# Patient Record
Sex: FEMALE | Race: WHITE | URBAN - NONMETROPOLITAN AREA
[De-identification: names, ages, dates, MRNs, and addresses within clinical notes are randomized per-mention and may not be internally consistent; named-entity substitution may affect disease eponyms.]

---

## 2024-03-20 ENCOUNTER — OFFICE VISIT (OUTPATIENT)
Dept: NEUROLOGY | Age: 49
End: 2024-03-20
Payer: COMMERCIAL

## 2024-03-20 VITALS
HEIGHT: 64 IN | SYSTOLIC BLOOD PRESSURE: 133 MMHG | HEART RATE: 72 BPM | DIASTOLIC BLOOD PRESSURE: 85 MMHG | WEIGHT: 175 LBS | OXYGEN SATURATION: 98 % | BODY MASS INDEX: 29.88 KG/M2

## 2024-03-20 DIAGNOSIS — R20.2 PARESTHESIAS: ICD-10-CM

## 2024-03-20 DIAGNOSIS — R20.2 PARESTHESIAS: Primary | ICD-10-CM

## 2024-03-20 LAB
ALBUMIN SERPL-MCNC: 4.3 G/DL (ref 3.5–5.2)
ALP SERPL-CCNC: 84 U/L (ref 35–104)
ALT SERPL-CCNC: 12 U/L (ref 5–33)
ANION GAP SERPL CALCULATED.3IONS-SCNC: 12 MMOL/L (ref 7–19)
AST SERPL-CCNC: 13 U/L (ref 5–32)
BASOPHILS # BLD: 0.1 K/UL (ref 0–0.2)
BASOPHILS NFR BLD: 0.9 % (ref 0–1)
BILIRUB SERPL-MCNC: 0.3 MG/DL (ref 0.2–1.2)
BUN SERPL-MCNC: 9 MG/DL (ref 6–20)
CALCIUM SERPL-MCNC: 9.6 MG/DL (ref 8.6–10)
CHLORIDE SERPL-SCNC: 106 MMOL/L (ref 98–111)
CO2 SERPL-SCNC: 26 MMOL/L (ref 22–29)
CREAT SERPL-MCNC: 0.8 MG/DL (ref 0.5–0.9)
CRP SERPL HS-MCNC: 0.48 MG/DL (ref 0–0.5)
EOSINOPHIL # BLD: 0.3 K/UL (ref 0–0.6)
EOSINOPHIL NFR BLD: 4.9 % (ref 0–5)
ERYTHROCYTE [DISTWIDTH] IN BLOOD BY AUTOMATED COUNT: 12.5 % (ref 11.5–14.5)
ERYTHROCYTE [SEDIMENTATION RATE] IN BLOOD BY WESTERGREN METHOD: 15 MM/HR (ref 0–25)
FOLATE SERPL-MCNC: 3.5 NG/ML (ref 4.8–37.3)
GLUCOSE SERPL-MCNC: 79 MG/DL (ref 74–109)
HCT VFR BLD AUTO: 43.2 % (ref 37–47)
HGB BLD-MCNC: 14.1 G/DL (ref 12–16)
IMM GRANULOCYTES # BLD: 0 K/UL
LYMPHOCYTES # BLD: 2.1 K/UL (ref 1.1–4.5)
LYMPHOCYTES NFR BLD: 37.4 % (ref 20–40)
MCH RBC QN AUTO: 29.8 PG (ref 27–31)
MCHC RBC AUTO-ENTMCNC: 32.6 G/DL (ref 33–37)
MCV RBC AUTO: 91.3 FL (ref 81–99)
MONOCYTES # BLD: 0.2 K/UL (ref 0–0.9)
MONOCYTES NFR BLD: 4.2 % (ref 0–10)
NEUTROPHILS # BLD: 2.9 K/UL (ref 1.5–7.5)
NEUTS SEG NFR BLD: 52.4 % (ref 50–65)
PLATELET # BLD AUTO: 247 K/UL (ref 130–400)
PMV BLD AUTO: 9.7 FL (ref 9.4–12.3)
POTASSIUM SERPL-SCNC: 4 MMOL/L (ref 3.5–5)
PROT SERPL-MCNC: 7.4 G/DL (ref 6.6–8.7)
RBC # BLD AUTO: 4.73 M/UL (ref 4.2–5.4)
RHEUMATOID FACT SER NEPH-ACNC: <10 IU/ML
SODIUM SERPL-SCNC: 144 MMOL/L (ref 136–145)
TSH SERPL DL<=0.005 MIU/L-ACNC: 1.52 UIU/ML (ref 0.35–5.5)
VIT B12 SERPL-MCNC: 249 PG/ML (ref 211–946)
WBC # BLD AUTO: 5.5 K/UL (ref 4.8–10.8)

## 2024-03-20 PROCEDURE — 99203 OFFICE O/P NEW LOW 30 MIN: CPT | Performed by: NURSE PRACTITIONER

## 2024-03-20 RX ORDER — SEMAGLUTIDE 2.4 MG/.75ML
2.4 INJECTION, SOLUTION SUBCUTANEOUS
COMMUNITY

## 2024-03-20 RX ORDER — OMEPRAZOLE 20 MG/1
20 CAPSULE, DELAYED RELEASE ORAL DAILY
COMMUNITY
Start: 2024-02-14

## 2024-03-20 RX ORDER — MONTELUKAST SODIUM 10 MG/1
10 TABLET ORAL
COMMUNITY

## 2024-03-20 RX ORDER — SIMVASTATIN 20 MG
20 TABLET ORAL EVERY EVENING
COMMUNITY

## 2024-03-20 RX ORDER — ONDANSETRON HYDROCHLORIDE 8 MG/1
8 TABLET, FILM COATED ORAL
COMMUNITY
Start: 2023-11-14

## 2024-03-20 RX ORDER — ESCITALOPRAM OXALATE 10 MG/1
10 TABLET ORAL
COMMUNITY

## 2024-03-20 NOTE — PROGRESS NOTES
REVIEW OF SYSTEMS    Constitutional: []Fever []Sweat []Chills [] Recent Injury [x] Denies all unless marked  HEENT:[x]Headache  [] Head Injury/Hearing Loss  [] Sore Throat  [] Ear Ache/Dizziness  [x] Denies all unless marked  Spine:  [x] Neck pain  [] Back pain  [] Sciaticia  [x] Denies all unless marked  Cardiovascular:[]Heart Disease []Chest Pain [] Palpitations  [x] Denies all unless marked  Pulmonary: []Shortness of Breath []Cough   [x] Denies all unless marke  Gastrointestinal: []Nausea  []Vomiting  []Abdominal Pain  []Constipation  []Diarrhea  []Dark Bloody Stools  [x] Denies all unless marked  Psychiatric/Behavioral:[] Depression [] Anxiety [x] Denies all unless marked  Genitourinary:   [] Frequency  [] Urgency  [] Incontinence [] Pain with Urination  [x] Denies all unless marked  Extremities: []Pain  []Swelling  [x] Denies all unless marked  Musculoskeletal: [] Muscle Pain  [] Joint Pain  [] Arthritis [] Muscle Cramps [] Muscle Twitches  [x] Denies all unless marked  Sleep: [] Insomnia [] Snoring [] Restless Legs [] Sleep Apnea  [] Daytime Sleepiness  [x] Denies all unless marked  Skin:[] Rash [] Skin Discoloration [x] Denies all unless marked   Neurological: []Visual Disturbance/Memory Loss [x] Loss of Balance [] Slurred Speech/Weakness [] Seizures  [] Vertigo/Dizziness [x] Denies all unless marked       
present  [x]No rigidity or bradykinesia noted  COMMENTS:   Sensory  []Sensation intact to light touch, pin prick, vibration, and proprioception BLE  []Sensation intact to light touch, pin prick, vibration, and proprioception BUE  COMMENTS: Decreased pinprick distal extremities   Coordination [x]FTN normal bilaterally   []HTS normal bilaterally  [x]ROSALES normal bilaterally.   COMMENTS:   Reflexes  []Symmetric and non-pathological  [x]Toes down going bilaterally  [x]No clonus present  COMMENTS: Hyperreflexic throughout   Gait                  [x]Normal steady gait    []Ataxic    []Spastic     []Magnetic     []Shuffling  COMMENTS:       LABS RECORD AND IMAGING REVIEW (As below and per HPI)      Reviewed referral records     ASSESSMENT:    Kacey Carlson is a 48 y.o. year old female here for evaluation of paresthesias, primarily to distal feet.  Ongoing for about 3 years with progressive worsening.  There is pain component described as throbbing, fairly constant but worse at night.  She has tried and failed topical treatments, stretching.  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  No clear weakness.  No clear claudication symptoms.  Exam with significantly decreased pinprick to distal extremities.  Consistent with neuropathy.  Will complete blood work for any underlying secondary factors and nerve conduction study for definitive diagnosis.  Consider treatment of pain with TCAs, Cymbalta, gabapentin or Lyrica at follow-up pending testing.      ICD-10-CM    1. Paresthesias  R20.2 Nerve Conduction Test with EMG     Electrophoresis Protein, Serum     Rheumatoid Factor     Sedimentation Rate     Vitamin B12 & Folate     CURTIS Screen with Reflex     Celiac AG IGA/IGG Screen w Reflex     Celiac Reflex Panel     Comprehensive Metabolic Panel     CBC with Auto Differential     C-Reactive Protein     Heavy Metal Blood Panel     TSH with Reflex to FT4            PLAN:  Labs as listed  NCS/EMG  Treatment pending

## 2024-03-21 ENCOUNTER — TELEPHONE (OUTPATIENT)
Dept: NEUROLOGY | Age: 49
End: 2024-03-21

## 2024-03-21 LAB
ARSENIC BLD-MCNC: <10 UG/L
CADMIUM BLD-MCNC: <1 UG/L
LEAD BLD-MCNC: <2 UG/DL
MERCURY BLD-MCNC: <2.5 UG/L

## 2024-03-21 NOTE — TELEPHONE ENCOUNTER
Called and spoke with patient. I advised her of BW note seen below she voiced her understanding and had no questions at this time.    ----- Message from CARMEN Araujo CNP sent at 3/20/2024 12:53 PM CDT -----  Please let her know that her vitamin B12 is on the low side of normal and her folate level was low, she needs to start taking an over-the-counter B12 supplement.

## 2024-03-22 LAB
NUCLEAR IGG SER QL IA: DETECTED
TTG IGA SER IA-ACNC: <1.02 FLU (ref 0–4.99)

## 2024-03-23 LAB
ALBUMIN SERPL-MCNC: 4.09 G/DL (ref 3.75–5.01)
ALPHA1 GLOB SERPL ELPH-MCNC: 0.26 G/DL (ref 0.19–0.46)
ALPHA2 GLOB SERPL ELPH-MCNC: 0.82 G/DL (ref 0.48–1.05)
ANTINUCLEAR AB INTERPRETIVE COMMENT: NORMAL
B-GLOBULIN SERPL ELPH-MCNC: 0.78 G/DL (ref 0.48–1.1)
GAMMA GLOB SERPL ELPH-MCNC: 1.16 G/DL (ref 0.62–1.51)
INTERPRETATION SERPL IFE-IMP: NORMAL
NUCLEAR IGG SER QL IF: NORMAL
PROT SERPL-MCNC: 7.1 G/DL (ref 6.3–8.2)
PROTEIN ELECTROPHORESIS, SERUM: NORMAL

## 2024-04-11 ENCOUNTER — HOSPITAL ENCOUNTER (OUTPATIENT)
Dept: NEUROLOGY | Age: 49
Discharge: HOME | End: 2024-04-11
Payer: COMMERCIAL

## 2024-04-11 DIAGNOSIS — R20.2 PARESTHESIAS: ICD-10-CM

## 2024-04-11 PROCEDURE — 95886 MUSC TEST DONE W/N TEST COMP: CPT | Performed by: PSYCHIATRY & NEUROLOGY

## 2024-04-11 PROCEDURE — 95886 MUSC TEST DONE W/N TEST COMP: CPT

## 2024-04-11 PROCEDURE — 95909 NRV CNDJ TST 5-6 STUDIES: CPT

## 2024-04-11 PROCEDURE — 95909 NRV CNDJ TST 5-6 STUDIES: CPT | Performed by: PSYCHIATRY & NEUROLOGY

## 2024-05-29 ENCOUNTER — OFFICE VISIT (OUTPATIENT)
Dept: NEUROLOGY | Age: 49
End: 2024-05-29
Payer: COMMERCIAL

## 2024-05-29 VITALS
SYSTOLIC BLOOD PRESSURE: 121 MMHG | DIASTOLIC BLOOD PRESSURE: 74 MMHG | WEIGHT: 175 LBS | OXYGEN SATURATION: 97 % | HEIGHT: 64 IN | HEART RATE: 62 BPM | BODY MASS INDEX: 29.88 KG/M2

## 2024-05-29 DIAGNOSIS — R52 BURNING PAIN: ICD-10-CM

## 2024-05-29 DIAGNOSIS — Z79.899 MEDICATION MANAGEMENT: ICD-10-CM

## 2024-05-29 DIAGNOSIS — E53.8 B12 DEFICIENCY: ICD-10-CM

## 2024-05-29 DIAGNOSIS — R20.2 PARESTHESIAS: ICD-10-CM

## 2024-05-29 DIAGNOSIS — R20.2 PARESTHESIAS: Primary | ICD-10-CM

## 2024-05-29 LAB
FOLATE SERPL-MCNC: >20 NG/ML (ref 4.8–37.3)
VIT B12 SERPL-MCNC: 859 PG/ML (ref 211–946)

## 2024-05-29 PROCEDURE — 99214 OFFICE O/P EST MOD 30 MIN: CPT | Performed by: NURSE PRACTITIONER

## 2024-05-29 RX ORDER — CHOLECALCIFEROL (VITAMIN D3) 25 MCG
TABLET,CHEWABLE ORAL
COMMUNITY
Start: 2024-04-01

## 2024-05-29 RX ORDER — NORTRIPTYLINE HYDROCHLORIDE 25 MG/1
25 CAPSULE ORAL NIGHTLY
Qty: 30 CAPSULE | Refills: 3 | Status: SHIPPED | OUTPATIENT
Start: 2024-05-29

## 2024-05-29 NOTE — PROGRESS NOTES
REVIEW OF SYSTEMS    Constitutional: []Fever []Sweat []Chills [] Recent Injury [x] Denies all unless marked  HEENT:[]Headache  [] Head Injury/Hearing Loss  [] Sore Throat  [] Ear Ache/Dizziness  [x] Denies all unless marked  Spine:  [x] Neck pain  [] Back pain  [] Sciaticia  [x] Denies all unless marked  Cardiovascular:[]Heart Disease []Chest Pain [] Palpitations  [x] Denies all unless marked  Pulmonary: []Shortness of Breath []Cough   [x] Denies all unless marke  Gastrointestinal: []Nausea  []Vomiting  []Abdominal Pain  []Constipation  []Diarrhea  []Dark Bloody Stools  [x] Denies all unless marked  Psychiatric/Behavioral:[] Depression [] Anxiety [x] Denies all unless marked  Genitourinary:   [] Frequency  [] Urgency  [] Incontinence [] Pain with Urination  [x] Denies all unless marked  Extremities: []Pain  []Swelling  [x] Denies all unless marked  Musculoskeletal: [] Muscle Pain  [] Joint Pain  [] Arthritis [] Muscle Cramps [] Muscle Twitches  [x] Denies all unless marked  Sleep: [] Insomnia [] Snoring [] Restless Legs [] Sleep Apnea  [] Daytime Sleepiness  [x] Denies all unless marked  Skin:[] Rash [] Skin Discoloration [x] Denies all unless marked   Neurological: []Visual Disturbance/Memory Loss [x] Loss of Balance [] Slurred Speech/Weakness [] Seizures  [] Vertigo/Dizziness [x] Denies all unless marked       
Will also start Pamelor 25 mg nightly to help with discomfort, can titrate this up.  Could consider gabapentin down the line.        ICD-10-CM    1. Paresthesias  R20.2 nortriptyline (PAMELOR) 25 MG capsule     Vitamin B12 & Folate     Vascular duplex lower extremity arteries bilateral with CHA      2. Burning pain  R52 nortriptyline (PAMELOR) 25 MG capsule     Vitamin B12 & Folate     Vascular duplex lower extremity arteries bilateral with CHA      3. B12 deficiency  E53.8       4. Medication management  Z79.899           PLAN:  Recheck vitamin B levels.  Continue vitamin B complex as discussed.  CHA's BLE  Start Pamelor 25 mg nightly, side effects discussed.  On Lexapro, discussed interactions.  With any worsening or new symptoms will complete MRI brain.  6.  Return in about 3 months (around 8/29/2024) for Follow up, sooner with worsening symptoms.    CARMEN Araujo    I spent 34 minutes caring for Kacey Carlson on this date of service. This time includes time spent by me in the following activities: preparing for the visit, reviewing tests, performing a medically appropriate examination and/or evaluation , counseling and educating the patient/family/caregiver, ordering medications, tests, or procedures, documenting information in the medical record and care coordination.    This dictation was generated by voice recognition computer software.  Although all attempts were made to edit the dictation for accuracy, there may be errors in the transcription that are not intended.

## 2024-06-17 DIAGNOSIS — R52 BURNING PAIN: Primary | ICD-10-CM

## 2024-06-17 RX ORDER — NORTRIPTYLINE HYDROCHLORIDE 10 MG/1
10 CAPSULE ORAL NIGHTLY
Qty: 30 CAPSULE | Refills: 3 | Status: SHIPPED | OUTPATIENT
Start: 2024-06-17

## 2024-06-21 ENCOUNTER — HOSPITAL ENCOUNTER (OUTPATIENT)
Dept: VASCULAR LAB | Age: 49
Discharge: HOME | End: 2024-06-21
Payer: COMMERCIAL

## 2024-06-21 DIAGNOSIS — R20.2 PARESTHESIAS: ICD-10-CM

## 2024-06-21 DIAGNOSIS — R52 BURNING PAIN: ICD-10-CM

## 2024-06-21 LAB
VAS IMMEDIATE LEFT ABI: 1.15
VAS IMMEDIATE LEFT POST TIBIAL BP: 117 MMHG
VAS IMMEDIATE RIGHT ABI: 1.23
VAS IMMEDIATE RIGHT BRACHIAL BP: 102 MMHG
VAS IMMEDIATE RIGHT POST TIBIAL BP: 125 MMHG
VAS LEFT ABI: 1.16
VAS LEFT ARM BP: 103 MMHG
VAS LEFT CALF PRESSURE: 142 MMHG
VAS LEFT DORSALIS PEDIS BP: 121 MMHG
VAS LEFT HIGH THIGH PRESSURE: 144 MMHG
VAS LEFT LOW THIGH PRESSURE: 144 MMHG
VAS LEFT PTA BP: 122 MMHG
VAS LEFT TBI: 0.68
VAS LEFT TOE PRESSURE: 71 MMHG
VAS RIGHT ABI: 1.22
VAS RIGHT ARM BP: 105 MMHG
VAS RIGHT CALF PRESSURE: 144 MMHG
VAS RIGHT DORSALIS PEDIS BP: 120 MMHG
VAS RIGHT HIGH THIGH PRESSURE: 149 MMHG
VAS RIGHT LOW THIGH PRESSURE: 143 MMHG
VAS RIGHT PTA BP: 128 MMHG
VAS RIGHT TBI: 0.49
VAS RIGHT TOE PRESSURE: 51 MMHG

## 2024-06-21 PROCEDURE — 93923 UPR/LXTR ART STDY 3+ LVLS: CPT

## 2024-06-25 PROCEDURE — 93923 UPR/LXTR ART STDY 3+ LVLS: CPT | Performed by: SURGERY

## 2024-08-12 ENCOUNTER — OFFICE VISIT (OUTPATIENT)
Dept: NEUROLOGY | Age: 49
End: 2024-08-12
Payer: COMMERCIAL

## 2024-08-12 VITALS
WEIGHT: 175 LBS | DIASTOLIC BLOOD PRESSURE: 92 MMHG | HEIGHT: 64 IN | HEART RATE: 79 BPM | BODY MASS INDEX: 29.88 KG/M2 | SYSTOLIC BLOOD PRESSURE: 130 MMHG | OXYGEN SATURATION: 98 %

## 2024-08-12 DIAGNOSIS — R20.2 PARESTHESIAS: Primary | ICD-10-CM

## 2024-08-12 DIAGNOSIS — R52 BURNING PAIN: ICD-10-CM

## 2024-08-12 DIAGNOSIS — Z79.899 MEDICATION MANAGEMENT: ICD-10-CM

## 2024-08-12 PROCEDURE — 99213 OFFICE O/P EST LOW 20 MIN: CPT | Performed by: NURSE PRACTITIONER

## 2024-08-12 RX ORDER — NORTRIPTYLINE HYDROCHLORIDE 25 MG/1
CAPSULE ORAL
COMMUNITY
Start: 2024-07-07

## 2024-08-12 NOTE — PROGRESS NOTES

## 2024-08-12 NOTE — PROGRESS NOTES
[facility] Office Note      Patient:   Kacey Carlson  MR#:    [id number]  Account Number:                         YOB: 1975  Date of Evaluation:  2024  Time of Note:                          3:06 PM  Primary/Referring Physician:  Sheree Olivares MD   Consulting Physician:  CARMEN Araujo    FOLLOW-UP      Chief Complaint   Patient presents with    Follow-up    Numbness     Pt states numbness is better.        HISTORY OF PRESENT ILLNESS    Kacey Carlson is a 48 y.o. year old female here for follow-up of BLE pain and paresthesias.  She is doing well, no worsening or new complaints.  At prior visit started Pamelor 25 mg nightly, has seen clear benefit from this medication.  Experiencing some dry mouth, overall tolerating well. At previous visit we discussed numbness and tingling to her bilateral distal feet, ongoing for about 3 years with progressive worsening.  She also notes some paresthesias to pinky, ring, middle finger at times when she is resting on her elbows.  There is some ongoing imbalance.  She states there is associated pain to feet, described as throbbing.  Worse at night.  Denies clear associated weakness.  Has used IcyHot, over-the-counter creams, stretching, with minimal benefit.  No clear worsening with exertion.  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 She is a .  NCS/EMG BLE normal.  Labs normal outside of low B12/folate level.  ABIs completed were unremarkable.      History reviewed. No pertinent past medical history.    Past Surgical History:   Procedure Laterality Date     SECTION      x3       History reviewed. No pertinent family history.    Social History     Socioeconomic History    Marital status:      Spouse name: Not on file    Number of children: Not on

## 2024-09-16 DIAGNOSIS — R52 PAIN, UNSPECIFIED: ICD-10-CM

## 2024-09-16 DIAGNOSIS — R20.2 PARESTHESIA OF SKIN: ICD-10-CM

## 2024-09-16 RX ORDER — NORTRIPTYLINE HCL 25 MG
25 CAPSULE ORAL NIGHTLY
Qty: 30 CAPSULE | Refills: 3 | Status: SHIPPED | OUTPATIENT
Start: 2024-09-16

## 2025-02-17 ENCOUNTER — OFFICE VISIT (OUTPATIENT)
Dept: NEUROLOGY | Age: 50
End: 2025-02-17
Payer: COMMERCIAL

## 2025-02-17 VITALS
HEIGHT: 64 IN | OXYGEN SATURATION: 96 % | SYSTOLIC BLOOD PRESSURE: 112 MMHG | WEIGHT: 175 LBS | HEART RATE: 78 BPM | BODY MASS INDEX: 29.88 KG/M2 | DIASTOLIC BLOOD PRESSURE: 81 MMHG

## 2025-02-17 DIAGNOSIS — R52 PAIN, UNSPECIFIED: ICD-10-CM

## 2025-02-17 DIAGNOSIS — R20.2 PARESTHESIA OF SKIN: ICD-10-CM

## 2025-02-17 PROCEDURE — 99213 OFFICE O/P EST LOW 20 MIN: CPT | Performed by: NURSE PRACTITIONER

## 2025-02-17 RX ORDER — NORTRIPTYLINE HYDROCHLORIDE 25 MG/1
25 CAPSULE ORAL NIGHTLY
Qty: 30 CAPSULE | Refills: 3 | Status: CANCELLED | OUTPATIENT
Start: 2025-02-17

## 2025-02-17 RX ORDER — NORTRIPTYLINE HYDROCHLORIDE 25 MG/1
25 CAPSULE ORAL NIGHTLY
Qty: 90 CAPSULE | Refills: 2 | Status: SHIPPED | OUTPATIENT
Start: 2025-02-17 | End: 2025-05-18

## 2025-02-17 RX ORDER — PAROXETINE 10 MG/1
10 TABLET, FILM COATED ORAL EVERY MORNING
COMMUNITY
Start: 2024-11-18

## 2025-02-17 RX ORDER — CETIRIZINE HCL/PSEUDOEPHEDRINE 5 MG-120MG
1 TABLET, EXTENDED RELEASE 12 HR ORAL EVERY 12 HOURS
COMMUNITY
Start: 2024-12-09

## 2025-02-17 NOTE — PROGRESS NOTES
[facility] Office Note      Patient:   Kacey Carlson  MR#:    [id number]  Account Number:                         YOB: 1975  Date of Evaluation:  2025  Time of Note:                          12:39 PM  Primary/Referring Physician:  Sheree Olivares MD   Consulting Physician:  CARMEN Araujo    FOLLOW-UP      Chief Complaint   Patient presents with    Follow-up    Tingling     Pt states \"shocking sensation\" on the outside of the right foot.        HISTORY OF PRESENT ILLNESS    Kacey Carlson is a 49 y.o. year old female here for follow-up of BLE pain and paresthesias.  Still doing well, has been on nortriptyline 25 mg nightly with continued benefit.  Has significantly decreased her pain.  Is having random intermittent shocking sensations to right lateral foot, this can last 5 to 6 minutes at a time.  Otherwise she is doing well. At previous visit we discussed numbness and tingling to her bilateral distal feet, ongoing for about 3 years with progressive worsening.  She also notes some paresthesias to pinky, ring, middle finger at times when she is resting on her elbows.  There is some ongoing imbalance.  As previously discussed there was significant ongoing pain to feet, described as throbbing.  Worse at night.  Denies clear associated weakness.  Has used IcyHot, over-the-counter creams, stretching, with minimal benefit.  No clear worsening with exertion.  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 She is a .  NCS/EMG BLE normal.  Labs normal outside of low B12/folate level.  ABIs completed were unremarkable.      History reviewed. No pertinent past medical history.    Past Surgical History:   Procedure Laterality Date     SECTION      x3       History reviewed. No pertinent family

## 2025-07-24 DIAGNOSIS — R20.2 PARESTHESIA OF SKIN: ICD-10-CM

## 2025-07-24 DIAGNOSIS — R52 PAIN, UNSPECIFIED: ICD-10-CM

## 2025-07-24 RX ORDER — NORTRIPTYLINE HYDROCHLORIDE 25 MG/1
50 CAPSULE ORAL NIGHTLY
Qty: 90 CAPSULE | Refills: 2 | Status: SHIPPED | OUTPATIENT
Start: 2025-07-24 | End: 2025-10-22

## 2025-08-06 ENCOUNTER — PATIENT MESSAGE (OUTPATIENT)
Dept: NEUROLOGY | Age: 50
End: 2025-08-06